# Patient Record
Sex: MALE | Race: BLACK OR AFRICAN AMERICAN | NOT HISPANIC OR LATINO | URBAN - METROPOLITAN AREA
[De-identification: names, ages, dates, MRNs, and addresses within clinical notes are randomized per-mention and may not be internally consistent; named-entity substitution may affect disease eponyms.]

---

## 2023-03-05 ENCOUNTER — EMERGENCY (EMERGENCY)
Facility: HOSPITAL | Age: 63
LOS: 0 days | Discharge: HOME | End: 2023-03-06
Attending: STUDENT IN AN ORGANIZED HEALTH CARE EDUCATION/TRAINING PROGRAM
Payer: COMMERCIAL

## 2023-03-05 VITALS
RESPIRATION RATE: 15 BRPM | WEIGHT: 250 LBS | HEIGHT: 74 IN | DIASTOLIC BLOOD PRESSURE: 88 MMHG | OXYGEN SATURATION: 97 % | SYSTOLIC BLOOD PRESSURE: 131 MMHG | HEART RATE: 99 BPM | TEMPERATURE: 98 F
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
## 2023-03-05 DIAGNOSIS — M54.50 LOW BACK PAIN, UNSPECIFIED: ICD-10-CM
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
## 2023-03-05 DIAGNOSIS — T14.8XXA OTHER INJURY OF UNSPECIFIED BODY REGION, INITIAL ENCOUNTER: ICD-10-CM

## 2023-03-05 PROCEDURE — 99284 EMERGENCY DEPT VISIT MOD MDM: CPT
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
## 2023-03-06 VITALS
RESPIRATION RATE: 14 BRPM | SYSTOLIC BLOOD PRESSURE: 118 MMHG | DIASTOLIC BLOOD PRESSURE: 83 MMHG | TEMPERATURE: 98 F | OXYGEN SATURATION: 98 % | HEART RATE: 81 BPM

## 2023-03-06 PROCEDURE — 72125 CT NECK SPINE W/O DYE: CPT | Mod: 26,MA

## 2023-03-06 PROCEDURE — 70450 CT HEAD/BRAIN W/O DYE: CPT | Mod: 26,MA

## 2023-03-06 PROCEDURE — 72131 CT LUMBAR SPINE W/O DYE: CPT | Mod: 26,MA

## 2023-03-06 RX ORDER — ACETAMINOPHEN 500 MG
650 TABLET ORAL ONCE
Refills: 0 | Status: COMPLETED | OUTPATIENT
Start: 2023-03-06 | End: 2023-03-06

## 2023-03-06 RX ORDER — CYCLOBENZAPRINE HYDROCHLORIDE 10 MG/1
10 TABLET, FILM COATED ORAL ONCE
Refills: 0 | Status: COMPLETED | OUTPATIENT
Start: 2023-03-06 | End: 2023-03-06

## 2023-03-06 RX ADMIN — CYCLOBENZAPRINE HYDROCHLORIDE 10 MILLIGRAM(S): 10 TABLET, FILM COATED ORAL at 00:43

## 2023-03-06 RX ADMIN — Medication 650 MILLIGRAM(S): at 01:13

## 2023-03-06 RX ADMIN — Medication 650 MILLIGRAM(S): at 00:43
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
## 2023-03-06 NOTE — ED PROVIDER NOTE - PATIENT PORTAL LINK FT
You can access the FollowMyHealth Patient Portal offered by [facility] by registering at the following website: [email or web address]. By joining [employer]’s FollowMyHealth portal, you will also be able to view your health information using other applications (apps) compatible with our system.

## 2023-03-06 NOTE — ED PROVIDER NOTE - PHYSICAL EXAMINATION
VITAL SIGNS: I have reviewed nursing notes and confirm.   GEN: Well-developed; well-nourished; in no acute distress. Speaking full sentences. GCS 15  SKIN: Warm, pink, no rash, no diaphoresis, no cyanosis, well perfused.   HEAD: Normocephalic; atraumatic. No scalp lacerations, no abrasions.  NECK: Supple; non tender.  NO midline ttp,  NO step offs,  Full ROM w/o pain.  EYES: Pupils 3mm equal, round, reactive to light and accomodation, conjunctiva and sclera clear. Extra-ocular movements intact bilaterally.  ENT: No nasal discharge; airway clear. Trachea is midline. ORAL: No oropharyngeal exudates or erythema. Normal dentition.  CV: Regular rate and rhythm. S1, S2 normal; no murmurs, gallops, or rubs. No lower extremity pitting edema bilaterally. Capillary refill < 2 seconds throughout. Distal pulses intact 2+ throughout.  NO chest wall TTP.  RESP: CTA bilaterally. No wheezes, rales, or rhonchi.   ABD: Normal bowel sounds, soft, non-distended, non-tender, no rebound, no guarding, no rigidity, no hepatosplenomegaly. No CVA tenderness bilaterally.   MSK: Normal range of motion and movement of all 4 extremities. No joint or muscular pain throughout. No clubbing.    BACK:  NO thoracolumbar midline TTP,  NO parathoracic TTP,  (+) mild paralumbar TTP. No step-offs or obvious deformities.   NEURO: Alert & oriented x 3, Grossly unremarkable. Sensory and motor intact throughout. No focal deficits. Gait: Fluid. Normal speech and coordination.   PSYCH: Cooperative, appropriate.
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
## 2023-03-27 NOTE — ED ADULT TRIAGE NOTE - STATUS:
Called Marbin to let him know that Dr. Martniez has ordered a 30 -day Cardiac event monitor for him to evaluate if he is having atrial fib or other arrythmias.  He is in agreement to get that done.  Order placed.          ----- Message from Fiona Martinez MD sent at 3/27/2023 12:03 PM CDT -----  Regarding: RE: Heart rate Irregularity per patient  We should order event monitor for 30 days     Please order     Thanks   Fiona   ----- Message -----  From: Farheen Chaudhry RN  Sent: 3/27/2023  10:37 AM CDT  To: Fiona Martinez MD, #  Subject: Heart rate Irregularity per patient              Melania-     Mr. Green was seen in clinic today for his post op appointment, s/p valve sparing root replacement with Dr. Scott Rome.     In the clinic he mentioned that recently he has noticed what to him feels like an \"irregular heart rate\" and well as notes his heart rate is high. He did post operatively have a junctional rhythm.     Luz Maria Pereira working with Dr. Rome requested I reach out to your office to update incase you wanted further testing like an event monitor prior to upcoming appointment with him. We did not get an EKG today as he was regular in office and asymptomatic.     He is on Metoprolol.          Intact